# Patient Record
(demographics unavailable — no encounter records)

---
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
## 2024-12-13 NOTE — DATA REVIEWED
[FreeTextEntry1] : On my interpretations of these images from [facility] on 12/13/2024: I have independently reviewed and interpreted these images. 2 V cervical XR- straightening of cervical lordosis, no FXs C4-5 mod DDD.   On my interpretations of these images from [facility] on 12/13/2024: I have independently reviewed and interpreted these images. 2 V lumbar XR- mild scoliotic deformity. mod facet joint arthritis L4-5, L5-s1, no FXs.
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
## 2024-12-13 NOTE — PHYSICAL EXAM
[Rotation to left] : rotation to left [Rotation to right] : rotation to right [Flexion] : flexion [Extension] : extension [Bending to left] : bending to left [Bending to right] : bending to right [4___] : right dorsiflexors 4[unfilled]/5 [de-identified] : Constitutional: - General Appearance: Unremarkable Body Habitus Well Developed Well Nourished Body Habitus No Deformities Well Groomed Ability To communicate: Normal Neurologic: Global sensation is intact to upper and lower extremities. See examination of Neck and/or Spine for exceptions. Orientation to Time, Place and Person is: Normal Mood And Affect is Normal Skin: - Head/Face, Right Upper/Lower Extremity, Left Upper/Lower Extremity: Normal See Examination of Neck and/or Spine for exceptions Cardiovascular: Peripheral Cardiovascular System is Normal Palpation of Lymph Nodes: Normal Palpation of lymph nodes in: Axilla, Cervical, Inguinal Abnormal Palpation of lymph nodes in: None  [de-identified] : left lateral rotation 25 degrees [TWNoteComboBox6] : right lateral rotation 5 degrees [] : non-antalgic [FreeTextEntry9] : very reduced ROM in all planes.
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
## 2024-12-13 NOTE — PHYSICAL EXAM
[Rotation to left] : rotation to left [Rotation to right] : rotation to right [Flexion] : flexion [Extension] : extension [Bending to left] : bending to left [Bending to right] : bending to right [4___] : right dorsiflexors 4[unfilled]/5 [de-identified] : Constitutional: - General Appearance: Unremarkable Body Habitus Well Developed Well Nourished Body Habitus No Deformities Well Groomed Ability To communicate: Normal Neurologic: Global sensation is intact to upper and lower extremities. See examination of Neck and/or Spine for exceptions. Orientation to Time, Place and Person is: Normal Mood And Affect is Normal Skin: - Head/Face, Right Upper/Lower Extremity, Left Upper/Lower Extremity: Normal See Examination of Neck and/or Spine for exceptions Cardiovascular: Peripheral Cardiovascular System is Normal Palpation of Lymph Nodes: Normal Palpation of lymph nodes in: Axilla, Cervical, Inguinal Abnormal Palpation of lymph nodes in: None  [de-identified] : left lateral rotation 25 degrees [TWNoteComboBox6] : right lateral rotation 5 degrees [] : non-antalgic [FreeTextEntry9] : very reduced ROM in all planes.
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
## 2024-12-13 NOTE — DATA REVIEWED
[FreeTextEntry1] : On my interpretations of these images from [facility] on 12/13/2024: I have independently reviewed and interpreted these images. 2 V cervical XR- straightening of cervical lordosis, no FXs C4-5 mod DDD.   On my interpretations of these images from [facility] on 12/13/2024: I have independently reviewed and interpreted these images. 2 V lumbar XR- mild scoliotic deformity. mod facet joint arthritis L4-5, L5-s1, no FXs.

## 2024-12-31 NOTE — DATA REVIEWED
[FreeTextEntry1] : On my interpretation of these images from [facility] 12/26/24. I have additionally reviewed the radiologist report. MRI- C2-3: NL.  C3-4: central protrusion, mild stenosis.  C4-5: central to R sided HNP, extends to R foramen, RT sided mild compression of spinal cord, severe C5 root compression on RT.  C5-6: mod DDD, broad based herniation w/ LT sided mild cord compression and L>R C6 root compression.   C6-7: central HNP, severe stenosis, spinal cord compression.  C7-T1: NL.   On my interpretation of these images from [facility] on 12/26/24. I have additionally reviewed the radiologist report. MRI-  L5-S1: disc bulge L4-5: mild-mod facet degeneration, central disc herniation extending to BL foramen, mild-mod lateral recess stenosis, mod-severe FS BL.  L3-4: mild facet degeneration, mild stenosis.  L2-3: NL  L1-2: NL T12-L1: NL ***No FXS  On my interpretations of these images from [facility] in [place] on 12/13/2024: I have independently reviewed and interpreted these images. 2 V cervical XR- straightening of cervical lordosis, no FXs C4-5 mod DDD.   On my interpretations of these images from [facility] in [place] on 12/13/2024: I have independently reviewed and interpreted these images. 2 V lumbar XR- mild scoliotic deformity. mod facet joint arthritis L4-5, L5-s1, no FXs.
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
## 2024-12-31 NOTE — PHYSICAL EXAM
[Bending to left] : bending to left [Bending to right] : bending to right [Flexion] : flexion [Extension] : extension [Rotation to left] : rotation to left [Rotation to right] : rotation to right [4___] : right grasp 4[unfilled]/5 [de-identified] : Constitutional: - General Appearance: Unremarkable Body Habitus Well Developed Well Nourished Body Habitus No Deformities Well Groomed Ability To communicate: Normal Neurologic: Global sensation is intact to upper and lower extremities. See examination of Neck and/or Spine for exceptions. Orientation to Time, Place and Person is: Normal Mood And Affect is Normal Skin: - Head/Face, Right Upper/Lower Extremity, Left Upper/Lower Extremity: Normal See Examination of Neck and/or Spine for exceptions Cardiovascular: Peripheral Cardiovascular System is Normal Palpation of Lymph Nodes: Normal Palpation of lymph nodes in: Axilla, Cervical, Inguinal Abnormal Palpation of lymph nodes in: None  [de-identified] : elbow flexion RT 4/5 & LT 4+/5.   [de-identified] : extension 0 degrees [de-identified] : left lateral rotation 25 degrees [TWNoteComboBox6] : right lateral rotation 25 degrees [] : non-antalgic [FreeTextEntry9] : very reduced ROM in all planes.  [de-identified] : intermittent paresthesia's L4 BL.

## 2024-12-31 NOTE — PHYSICAL EXAM
[Bending to left] : bending to left [Bending to right] : bending to right [Flexion] : flexion [Extension] : extension [Rotation to left] : rotation to left [Rotation to right] : rotation to right [4___] : right grasp 4[unfilled]/5 [de-identified] : Constitutional: - General Appearance: Unremarkable Body Habitus Well Developed Well Nourished Body Habitus No Deformities Well Groomed Ability To communicate: Normal Neurologic: Global sensation is intact to upper and lower extremities. See examination of Neck and/or Spine for exceptions. Orientation to Time, Place and Person is: Normal Mood And Affect is Normal Skin: - Head/Face, Right Upper/Lower Extremity, Left Upper/Lower Extremity: Normal See Examination of Neck and/or Spine for exceptions Cardiovascular: Peripheral Cardiovascular System is Normal Palpation of Lymph Nodes: Normal Palpation of lymph nodes in: Axilla, Cervical, Inguinal Abnormal Palpation of lymph nodes in: None  [de-identified] : elbow flexion RT 4/5 & LT 4+/5.   [de-identified] : extension 0 degrees [de-identified] : left lateral rotation 25 degrees [TWNoteComboBox6] : right lateral rotation 25 degrees [] : non-antalgic [FreeTextEntry9] : very reduced ROM in all planes.  [de-identified] : intermittent paresthesia's L4 BL.
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
## 2024-12-31 NOTE — DATA REVIEWED
[FreeTextEntry1] : On my interpretation of these images from [facility] 12/26/24. I have additionally reviewed the radiologist report. MRI- C2-3: NL.  C3-4: central protrusion, mild stenosis.  C4-5: central to R sided HNP, extends to R foramen, RT sided mild compression of spinal cord, severe C5 root compression on RT.  C5-6: mod DDD, broad based herniation w/ LT sided mild cord compression and L>R C6 root compression.   C6-7: central HNP, severe stenosis, spinal cord compression.  C7-T1: NL.   On my interpretation of these images from [facility] on 12/26/24. I have additionally reviewed the radiologist report. MRI-  L5-S1: disc bulge L4-5: mild-mod facet degeneration, central disc herniation extending to BL foramen, mild-mod lateral recess stenosis, mod-severe FS BL.  L3-4: mild facet degeneration, mild stenosis.  L2-3: NL  L1-2: NL T12-L1: NL ***No FXS  On my interpretations of these images from [facility] in [place] on 12/13/2024: I have independently reviewed and interpreted these images. 2 V cervical XR- straightening of cervical lordosis, no FXs C4-5 mod DDD.   On my interpretations of these images from [facility] in [place] on 12/13/2024: I have independently reviewed and interpreted these images. 2 V lumbar XR- mild scoliotic deformity. mod facet joint arthritis L4-5, L5-s1, no FXs.
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
## 2025-01-22 NOTE — PHYSICAL EXAM
[Rotation to left] : rotation to left [Rotation to right] : rotation to right [Flexion] : flexion [Extension] : extension [Bending to left] : bending to left [Bending to right] : bending to right [4___] : right dorsiflexors 4[unfilled]/5 [de-identified] : Constitutional: - General Appearance: Unremarkable Body Habitus Well Developed Well Nourished Body Habitus No Deformities Well Groomed Ability To communicate: Normal Neurologic: Global sensation is intact to upper and lower extremities. See examination of Neck and/or Spine for exceptions. Orientation to Time, Place and Person is: Normal Mood And Affect is Normal Skin: - Head/Face, Right Upper/Lower Extremity, Left Upper/Lower Extremity: Normal See Examination of Neck and/or Spine for exceptions Cardiovascular: Peripheral Cardiovascular System is Normal Palpation of Lymph Nodes: Normal Palpation of lymph nodes in: Axilla, Cervical, Inguinal Abnormal Palpation of lymph nodes in: None  [de-identified] : elbow flexion RT 4/5 & LT 4+/5.   [de-identified] : extension 0 degrees [de-identified] : left lateral rotation 25 degrees [TWNoteComboBox6] : right lateral rotation 25 degrees [] : non-antalgic [FreeTextEntry9] : very reduced ROM in all planes.  [de-identified] : intermittent paresthesia's L4 BL.

## 2025-01-22 NOTE — PHYSICAL EXAM
[Rotation to left] : rotation to left [Rotation to right] : rotation to right [Flexion] : flexion [Extension] : extension [Bending to left] : bending to left [Bending to right] : bending to right [4___] : right dorsiflexors 4[unfilled]/5 [de-identified] : Constitutional: - General Appearance: Unremarkable Body Habitus Well Developed Well Nourished Body Habitus No Deformities Well Groomed Ability To communicate: Normal Neurologic: Global sensation is intact to upper and lower extremities. See examination of Neck and/or Spine for exceptions. Orientation to Time, Place and Person is: Normal Mood And Affect is Normal Skin: - Head/Face, Right Upper/Lower Extremity, Left Upper/Lower Extremity: Normal See Examination of Neck and/or Spine for exceptions Cardiovascular: Peripheral Cardiovascular System is Normal Palpation of Lymph Nodes: Normal Palpation of lymph nodes in: Axilla, Cervical, Inguinal Abnormal Palpation of lymph nodes in: None  [de-identified] : elbow flexion RT 4/5 & LT 4+/5.   [de-identified] : extension 0 degrees [de-identified] : left lateral rotation 25 degrees [TWNoteComboBox6] : right lateral rotation 25 degrees [] : non-antalgic [FreeTextEntry9] : very reduced ROM in all planes.  [de-identified] : intermittent paresthesia's L4 BL.
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
## 2025-01-22 NOTE — DATA REVIEWED
[FreeTextEntry1] : On my interpretation of these images from [facility] 12/26/24. I have additionally reviewed the radiologist report. MRI- C2-3: NL.  C3-4: central protrusion, mild stenosis.  C4-5: central to R sided HNP, extends to R foramen, RT sided mild compression of spinal cord, severe C5 root compression on RT.  C5-6: mod DDD, broad based herniation w/ LT sided mild cord compression and L>R C6 root compression.   C6-7: central HNP, severe stenosis, spinal cord compression.  C7-T1: NL.   On my interpretation of these images from [facility] on 12/26/24. I have additionally reviewed the radiologist report. MRI-  L5-S1: disc bulge L4-5: mild-mod facet degeneration, central disc herniation extending to BL foramen, mild-mod lateral recess stenosis, mod-severe FS BL.  L3-4: mild facet degeneration, mild stenosis.  L2-3: NL  L1-2: NL T12-L1: NL ***No FXS  On my interpretations of these images from [facility] in [place] on 12/13/2024: I have independently reviewed and interpreted these images. 2 V cervical XR- straightening of cervical lordosis, no FXs C4-5 mod DDD.   On my interpretations of these images from [facility] in [place] on 12/13/2024: I have independently reviewed and interpreted these images. 2 V lumbar XR- mild scoliotic deformity. mod facet joint arthritis L4-5, L5-s1, no FXs.

## 2025-01-22 NOTE — DATA REVIEWED
[FreeTextEntry1] : On my interpretation of these images from [facility] 12/26/24. I have additionally reviewed the radiologist report. MRI- C2-3: NL.  C3-4: central protrusion, mild stenosis.  C4-5: central to R sided HNP, extends to R foramen, RT sided mild compression of spinal cord, severe C5 root compression on RT.  C5-6: mod DDD, broad based herniation w/ LT sided mild cord compression and L>R C6 root compression.   C6-7: central HNP, severe stenosis, spinal cord compression.  C7-T1: NL.   On my interpretation of these images from [facility] on 12/26/24. I have additionally reviewed the radiologist report. MRI-  L5-S1: disc bulge L4-5: mild-mod facet degeneration, central disc herniation extending to BL foramen, mild-mod lateral recess stenosis, mod-severe FS BL.  L3-4: mild facet degeneration, mild stenosis.  L2-3: NL  L1-2: NL T12-L1: NL ***No FXS  On my interpretations of these images from [facility] in [place] on 12/13/2024: I have independently reviewed and interpreted these images. 2 V cervical XR- straightening of cervical lordosis, no FXs C4-5 mod DDD.   On my interpretations of these images from [facility] in [place] on 12/13/2024: I have independently reviewed and interpreted these images. 2 V lumbar XR- mild scoliotic deformity. mod facet joint arthritis L4-5, L5-s1, no FXs.
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
## 2025-01-30 NOTE — PHYSICAL EXAM
[de-identified] : Constitutional: - No acute distress - Well developed; well nourished   Neurological: - normal mood and affect - alert and oriented x 3   Cardiovascular: - grossly normal  Lumbar Spine Exam:   Inspection: erythema (-) ecchymosis (-) rashes (-) alignment: no scoliosis   Palpation: Midline lumbar tenderness:           (-) midline thoracic tenderness:         (-) Lumbar paraspinal tenderness:      L (+); R (+) thoracic paraspinal tenderness:     L (-); R (-) sciatic nerve tenderness:               L (-); R (-) SI joint tenderness:                        L (-); R (-) GTB tenderness:                            L (-); R (-)   ROM:  WNL Pain with extremes of flexion/extension   Strength:                                    Right       Left  Hip Flexion:                (4/5)       (4/5) Quadriceps:               (5/5)       (5/5) Hamstrings:                (5/5)       (5/5) Ankle Dorsiflexion:     (5/5)       (5/5) EHL:                           (5/5)       (5/5) Ankle Plantarflexion:  (5/5)       (5/5)   Special Tests: SLR:                           R (+) ; L (+) Facet loading:            R (-) ; L (-) JULIETA test:               R (n/a) ; L (n/a) Hamstring tightness:  R (-);  L (-)   Neurologic: SI LT throughout right lower extremity SI LT throughout left lower extremity   Reflexes normal and symmetric bilateral lower extremities   Gait: non- antalgic gait ambulates without assistive device

## 2025-01-30 NOTE — ASSESSMENT
[FreeTextEntry1] : We discussed the nature of the underlying pathology and available pain management treatment options. These included interventional, rehabilitative, pharmacological, and complementary modalities. We will proceed with the following:    Interventional treatment options: - Proceed with bilateral L4-L5 TFESI with fluoroscopic guidance - Patient not candidate for intracanal cervical procedure due to degree of spinal canal stenosis - see additional instructions below    Rehabilitative options: - Continue physical therapy as per orthopedics - Participation in active HEP was discussed and encouraged as tolerated   Medication based treatment options: - Continue naproxen 500 mg up to BID as needed - Continue lidocaine patch as needed - See additional instructions below    Complementary treatment options: - Weight management and lifestyle modifications discussed   Additional treatment recommendations as follows: - patient will follow up with Dr. Lora as directed; patient considering a second surgical opinion for her cervical spine - Patient was counseled as to red flag signs and when to seek urgent care. - Follow up 1-2 weeks post injection for assessment of efficacy and further treatment recommendations  The risks, benefits and alternatives of the proposed procedure were explained in detail with the patient. The risks outlined include but are not limited to infection, bleeding, post- dural puncture headache, nerve injury, a temporary increase in pain, failure to resolve symptoms, need for future interventions, allergic reaction, and possible elevation of blood sugar in diabetics if using corticosteroid.  All questions were answered to patient's apparent satisfaction, and he/she verbalized an understanding.  We have discussed the risks, benefits, and alternatives for NSAID therapy including but not limited to the risk of bleeding, thrombosis, gastric mucosal irritation/ulceration, allergic reaction and kidney dysfunction.  The patient was counseled to utilize NSAIDs concurrently with food and/or a PPI if applicable.  They were counseled to use the lowest effective dose for the shortest duration. The patient verbalizes an understanding.  I, Christi Bynum, acting as scribe, attest that this documentation has been prepared under the direction and in the presence of Provider Louie Pittman DO.  The documentation recorded by the scribe, in my presence, accurately reflects the service I personally performed, and the decisions made by me with my edits as appropriate.

## 2025-01-30 NOTE — PHYSICAL EXAM
[de-identified] : Constitutional: - No acute distress - Well developed; well nourished   Neurological: - normal mood and affect - alert and oriented x 3   Cardiovascular: - grossly normal  Lumbar Spine Exam:   Inspection: erythema (-) ecchymosis (-) rashes (-) alignment: no scoliosis   Palpation: Midline lumbar tenderness:           (-) midline thoracic tenderness:         (-) Lumbar paraspinal tenderness:      L (+); R (+) thoracic paraspinal tenderness:     L (-); R (-) sciatic nerve tenderness:               L (-); R (-) SI joint tenderness:                        L (-); R (-) GTB tenderness:                            L (-); R (-)   ROM:  WNL Pain with extremes of flexion/extension   Strength:                                    Right       Left  Hip Flexion:                (4/5)       (4/5) Quadriceps:               (5/5)       (5/5) Hamstrings:                (5/5)       (5/5) Ankle Dorsiflexion:     (5/5)       (5/5) EHL:                           (5/5)       (5/5) Ankle Plantarflexion:  (5/5)       (5/5)   Special Tests: SLR:                           R (+) ; L (+) Facet loading:            R (-) ; L (-) JULIETA test:               R (n/a) ; L (n/a) Hamstring tightness:  R (-);  L (-)   Neurologic: SI LT throughout right lower extremity SI LT throughout left lower extremity   Reflexes normal and symmetric bilateral lower extremities   Gait: non- antalgic gait ambulates without assistive device
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
## 2025-03-02 NOTE — DATA REVIEWED
[FreeTextEntry1] : On my interpretation of these images from [facility] 12/26/24. I have additionally reviewed the radiologist report. MRI- C2-3: NL.  C3-4: central protrusion, mild stenosis.  C4-5: central to R sided HNP, extends to R foramen, RT sided mild compression of spinal cord, severe C5 root compression on RT.  C5-6: mod DDD, broad based herniation w/ LT sided mild cord compression and L>R C6 root compression.   C6-7: central HNP, severe stenosis, spinal cord compression.  C7-T1: NL.   On my interpretation of these images from [facility] on 12/26/24. I have additionally reviewed the radiologist report. MRI-  L5-S1: disc bulge L4-5: mild-mod facet degeneration, central disc herniation extending to BL foramen, mild-mod lateral recess stenosis, mod-severe FS BL.  L3-4: mild facet degeneration, mild stenosis.  L2-3: NL  L1-2: NL T12-L1: NL ***No FXS  On my interpretations of these images from [facility] in [place] on 12/13/2024: I have independently reviewed and interpreted these images. 2 V cervical XR- straightening of cervical lordosis, no FXs C4-5 mod DDD.   On my interpretations of these images from [facility] in [place] on 12/13/2024: I have independently reviewed and interpreted these images. 2 V lumbar XR- mild scoliotic deformity. mod facet joint arthritis L4-5, L5-s1, no FXs.

## 2025-03-02 NOTE — PHYSICAL EXAM
[Rotation to left] : rotation to left [Rotation to right] : rotation to right [Flexion] : flexion [Extension] : extension [Bending to left] : bending to left [Bending to right] : bending to right [4___] : right dorsiflexors 4[unfilled]/5 [de-identified] : Constitutional: - General Appearance: Unremarkable Body Habitus Well Developed Well Nourished Body Habitus No Deformities Well Groomed Ability To communicate: Normal Neurologic: Global sensation is intact to upper and lower extremities. See examination of Neck and/or Spine for exceptions. Orientation to Time, Place and Person is: Normal Mood And Affect is Normal Skin: - Head/Face, Right Upper/Lower Extremity, Left Upper/Lower Extremity: Normal See Examination of Neck and/or Spine for exceptions Cardiovascular: Peripheral Cardiovascular System is Normal Palpation of Lymph Nodes: Normal Palpation of lymph nodes in: Axilla, Cervical, Inguinal Abnormal Palpation of lymph nodes in: None  [de-identified] : diffuse 4+ UE [de-identified] : extension 10 degrees [de-identified] : left lateral rotation 45 degrees [TWNoteComboBox6] : right lateral rotation 45 degrees [] : non-antalgic [de-identified] : intermittent paresthesia's L4 BL.  [FreeTextEntry9] : very reduced ROM in all planes.
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
## 2025-03-02 NOTE — DATA REVIEWED
[FreeTextEntry1] : On my interpretation of these images from [facility] 12/26/24. I have additionally reviewed the radiologist report. MRI- C2-3: NL.  C3-4: central protrusion, mild stenosis.  C4-5: central to R sided HNP, extends to R foramen, RT sided mild compression of spinal cord, severe C5 root compression on RT.  C5-6: mod DDD, broad based herniation w/ LT sided mild cord compression and L>R C6 root compression.   C6-7: central HNP, severe stenosis, spinal cord compression.  C7-T1: NL.   On my interpretation of these images from [facility] on 12/26/24. I have additionally reviewed the radiologist report. MRI-  L5-S1: disc bulge L4-5: mild-mod facet degeneration, central disc herniation extending to BL foramen, mild-mod lateral recess stenosis, mod-severe FS BL.  L3-4: mild facet degeneration, mild stenosis.  L2-3: NL  L1-2: NL T12-L1: NL ***No FXS  On my interpretations of these images from [facility] in [place] on 12/13/2024: I have independently reviewed and interpreted these images. 2 V cervical XR- straightening of cervical lordosis, no FXs C4-5 mod DDD.   On my interpretations of these images from [facility] in [place] on 12/13/2024: I have independently reviewed and interpreted these images. 2 V lumbar XR- mild scoliotic deformity. mod facet joint arthritis L4-5, L5-s1, no FXs.
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
## 2025-03-02 NOTE — PHYSICAL EXAM
[Rotation to left] : rotation to left [Rotation to right] : rotation to right [Flexion] : flexion [Extension] : extension [Bending to left] : bending to left [Bending to right] : bending to right [4___] : right dorsiflexors 4[unfilled]/5 [de-identified] : Constitutional: - General Appearance: Unremarkable Body Habitus Well Developed Well Nourished Body Habitus No Deformities Well Groomed Ability To communicate: Normal Neurologic: Global sensation is intact to upper and lower extremities. See examination of Neck and/or Spine for exceptions. Orientation to Time, Place and Person is: Normal Mood And Affect is Normal Skin: - Head/Face, Right Upper/Lower Extremity, Left Upper/Lower Extremity: Normal See Examination of Neck and/or Spine for exceptions Cardiovascular: Peripheral Cardiovascular System is Normal Palpation of Lymph Nodes: Normal Palpation of lymph nodes in: Axilla, Cervical, Inguinal Abnormal Palpation of lymph nodes in: None  [de-identified] : diffuse 4+ UE [de-identified] : extension 10 degrees [de-identified] : left lateral rotation 45 degrees [TWNoteComboBox6] : right lateral rotation 45 degrees [] : non-antalgic [de-identified] : intermittent paresthesia's L4 BL.  [FreeTextEntry9] : very reduced ROM in all planes.
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
## 2025-04-17 NOTE — DATA REVIEWED
[FreeTextEntry1] : On my interpretation of these images from [facility] 12/26/24. I have additionally reviewed the radiologist report. MRI- C2-3: NL.  C3-4: central protrusion, mild stenosis.  C4-5: central to R sided HNP, extends to R foramen, RT sided mild compression of spinal cord, severe C5 root compression on RT.  C5-6: mod DDD, broad based herniation w/ LT sided mild cord compression and L>R C6 root compression.   C6-7: central HNP, severe stenosis, spinal cord compression.  C7-T1: NL.   On my interpretation of these images from [facility] on 12/26/24. I have additionally reviewed the radiologist report. MRI-  L5-S1: disc bulge L4-5: mild-mod facet degeneration, central disc herniation extending to BL foramen, mild-mod lateral recess stenosis, mod-severe FS BL.  L3-4: mild facet degeneration, mild stenosis.  L2-3: NL  L1-2: NL T12-L1: NL ***No FXS  On my interpretations of these images from [facility] in [place] on 12/13/2024: I have independently reviewed and interpreted these images. 2 V cervical XR- straightening of cervical lordosis, no FXs C4-5 mod DDD.   On my interpretations of these images from [facility] in [place] on 12/13/2024: I have independently reviewed and interpreted these images. 2 V lumbar XR- mild scoliotic deformity. mod facet joint arthritis L4-5, L5-s1, no FXs.

## 2025-04-17 NOTE — DATA REVIEWED
[FreeTextEntry1] : On my interpretation of these images from [facility] 12/26/24. I have additionally reviewed the radiologist report. MRI- C2-3: NL.  C3-4: central protrusion, mild stenosis.  C4-5: central to R sided HNP, extends to R foramen, RT sided mild compression of spinal cord, severe C5 root compression on RT.  C5-6: mod DDD, broad based herniation w/ LT sided mild cord compression and L>R C6 root compression.   C6-7: central HNP, severe stenosis, spinal cord compression.  C7-T1: NL.   On my interpretation of these images from [facility] on 12/26/24. I have additionally reviewed the radiologist report. MRI-  L5-S1: disc bulge L4-5: mild-mod facet degeneration, central disc herniation extending to BL foramen, mild-mod lateral recess stenosis, mod-severe FS BL.  L3-4: mild facet degeneration, mild stenosis.  L2-3: NL  L1-2: NL T12-L1: NL ***No FXS  On my interpretations of these images from [facility] in [place] on 12/13/2024: I have independently reviewed and interpreted these images. 2 V cervical XR- straightening of cervical lordosis, no FXs C4-5 mod DDD.   On my interpretations of these images from [facility] in [place] on 12/13/2024: I have independently reviewed and interpreted these images. 2 V lumbar XR- mild scoliotic deformity. mod facet joint arthritis L4-5, L5-s1, no FXs.

## 2025-04-17 NOTE — PHYSICAL EXAM
[de-identified] : Constitutional: - General Appearance: Unremarkable Body Habitus Well Developed Well Nourished Body Habitus No Deformities Well Groomed Ability To communicate: Normal Neurologic: Global sensation is intact to upper and lower extremities. See examination of Neck and/or Spine for exceptions. Orientation to Time, Place and Person is: Normal Mood And Affect is Normal Skin: - Head/Face, Right Upper/Lower Extremity, Left Upper/Lower Extremity: Normal See Examination of Neck and/or Spine for exceptions Cardiovascular: Peripheral Cardiovascular System is Normal Palpation of Lymph Nodes: Normal Palpation of lymph nodes in: Axilla, Cervical, Inguinal Abnormal Palpation of lymph nodes in: None  [de-identified] : elbow flexion BL 4/5. Digitial extension 4/5 BL.  [de-identified] : extension 10 degrees [de-identified] : left lateral rotation 45 degrees [TWNoteComboBox6] : right lateral rotation 45 degrees [] : non-antalgic [FreeTextEntry8] : L>R lumbar paraspinals tenderness.  [FreeTextEntry9] : very reduced ROM in all planes.  [de-identified] : L>R SLR
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
## 2025-04-17 NOTE — PHYSICAL EXAM
[de-identified] : Constitutional: - General Appearance: Unremarkable Body Habitus Well Developed Well Nourished Body Habitus No Deformities Well Groomed Ability To communicate: Normal Neurologic: Global sensation is intact to upper and lower extremities. See examination of Neck and/or Spine for exceptions. Orientation to Time, Place and Person is: Normal Mood And Affect is Normal Skin: - Head/Face, Right Upper/Lower Extremity, Left Upper/Lower Extremity: Normal See Examination of Neck and/or Spine for exceptions Cardiovascular: Peripheral Cardiovascular System is Normal Palpation of Lymph Nodes: Normal Palpation of lymph nodes in: Axilla, Cervical, Inguinal Abnormal Palpation of lymph nodes in: None  [de-identified] : elbow flexion BL 4/5. Digitial extension 4/5 BL.  [de-identified] : extension 10 degrees [de-identified] : left lateral rotation 45 degrees [TWNoteComboBox6] : right lateral rotation 45 degrees [] : non-antalgic [FreeTextEntry8] : L>R lumbar paraspinals tenderness.  [FreeTextEntry9] : very reduced ROM in all planes.  [de-identified] : L>R SLR
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
